# Patient Record
Sex: FEMALE | Race: BLACK OR AFRICAN AMERICAN
[De-identification: names, ages, dates, MRNs, and addresses within clinical notes are randomized per-mention and may not be internally consistent; named-entity substitution may affect disease eponyms.]

---

## 2017-08-21 ENCOUNTER — HOSPITAL ENCOUNTER (EMERGENCY)
Dept: HOSPITAL 62 - ER | Age: 27
LOS: 1 days | Discharge: HOME | End: 2017-08-22
Payer: COMMERCIAL

## 2017-08-21 DIAGNOSIS — R06.00: ICD-10-CM

## 2017-08-21 DIAGNOSIS — R06.02: ICD-10-CM

## 2017-08-21 DIAGNOSIS — R00.0: ICD-10-CM

## 2017-08-21 DIAGNOSIS — J18.1: Primary | ICD-10-CM

## 2017-08-21 DIAGNOSIS — R06.01: ICD-10-CM

## 2017-08-21 PROCEDURE — 96360 HYDRATION IV INFUSION INIT: CPT

## 2017-08-21 PROCEDURE — 93010 ELECTROCARDIOGRAM REPORT: CPT

## 2017-08-21 PROCEDURE — 36415 COLL VENOUS BLD VENIPUNCTURE: CPT

## 2017-08-21 PROCEDURE — 71010: CPT

## 2017-08-21 PROCEDURE — 99285 EMERGENCY DEPT VISIT HI MDM: CPT

## 2017-08-21 PROCEDURE — 83880 ASSAY OF NATRIURETIC PEPTIDE: CPT

## 2017-08-21 PROCEDURE — 85379 FIBRIN DEGRADATION QUANT: CPT

## 2017-08-21 PROCEDURE — 93005 ELECTROCARDIOGRAM TRACING: CPT

## 2017-08-21 PROCEDURE — 85025 COMPLETE CBC W/AUTO DIFF WBC: CPT

## 2017-08-21 PROCEDURE — 80048 BASIC METABOLIC PNL TOTAL CA: CPT

## 2017-08-21 PROCEDURE — 84484 ASSAY OF TROPONIN QUANT: CPT

## 2017-08-22 VITALS — SYSTOLIC BLOOD PRESSURE: 133 MMHG | DIASTOLIC BLOOD PRESSURE: 95 MMHG

## 2017-08-22 LAB
ANION GAP SERPL CALC-SCNC: 8 MMOL/L (ref 5–19)
BASOPHILS # BLD AUTO: 0.1 10^3/UL (ref 0–0.2)
BASOPHILS NFR BLD AUTO: 1.1 % (ref 0–2)
BUN SERPL-MCNC: 11 MG/DL (ref 7–20)
CALCIUM: 9 MG/DL (ref 8.4–10.2)
CHLORIDE SERPL-SCNC: 103 MMOL/L (ref 98–107)
CO2 SERPL-SCNC: 26 MMOL/L (ref 22–30)
CREAT SERPL-MCNC: 0.96 MG/DL (ref 0.52–1.25)
EOSINOPHIL # BLD AUTO: 0.1 10^3/UL (ref 0–0.6)
EOSINOPHIL NFR BLD AUTO: 0.8 % (ref 0–6)
ERYTHROCYTE [DISTWIDTH] IN BLOOD BY AUTOMATED COUNT: 14.6 % (ref 11.5–14)
GLUCOSE SERPL-MCNC: 96 MG/DL (ref 75–110)
HCT VFR BLD CALC: 35.7 % (ref 36–47)
HGB BLD-MCNC: 11.7 G/DL (ref 12–15.5)
HGB HCT DIFFERENCE: -0.6
LYMPHOCYTES # BLD AUTO: 1.1 10^3/UL (ref 0.5–4.7)
LYMPHOCYTES NFR BLD AUTO: 14.5 % (ref 13–45)
MCH RBC QN AUTO: 21.8 PG (ref 27–33.4)
MCHC RBC AUTO-ENTMCNC: 32.8 G/DL (ref 32–36)
MCV RBC AUTO: 67 FL (ref 80–97)
MONOCYTES # BLD AUTO: 0.9 10^3/UL (ref 0.1–1.4)
MONOCYTES NFR BLD AUTO: 11.9 % (ref 3–13)
NEUTROPHILS # BLD AUTO: 5.3 10^3/UL (ref 1.7–8.2)
NEUTS SEG NFR BLD AUTO: 71.7 % (ref 42–78)
POTASSIUM SERPL-SCNC: 3.9 MMOL/L (ref 3.6–5)
RBC # BLD AUTO: 5.37 10^6/UL (ref 3.72–5.28)
SODIUM SERPL-SCNC: 137.1 MMOL/L (ref 137–145)
TROPONIN I SERPL-MCNC: < 0.012 NG/ML
WBC # BLD AUTO: 7.4 10^3/UL (ref 4–10.5)

## 2017-08-22 NOTE — ER DOCUMENT REPORT
ED General





- General


Chief Complaint: Cough


Stated Complaint: TROUBLE BREATHING


Time Seen by Provider: 08/22/17 00:00


Notes: 





Patient is a 27-year-old female without past medical history who presents with 

36 hours of cough, shortness of breath and lightheadedness.  No known sick 

contacts.  Denies any fever.  No history of similar symptoms in the past.  She 

has not seen her primary care doctor regarding today's concerns.  Nothing 

improves or worsens her symptoms.  She denies any history of DVT or pulmonary 

embolus.  She denies any chest pain or pleuritic pain.  Her shortness of breath 

does not prevent her from walking or completing activities of daily living.  

Denies any use of estrogen.


TRAVEL OUTSIDE OF THE [place] IN LAST 30 DAYS: No





- Related Data


Allergies/Adverse Reactions: 


 





No Known Allergies Allergy (Unverified 01/03/14 03:41)


 











Past Medical History





- General


Information source: Patient





- Social History


Smoking Status: Never Smoker


Frequency of alcohol use: None


Drug Abuse: None


Lives with: Family


Family History: Reviewed & Not Pertinent


Patient has suicidal ideation: No


Patient has homicidal ideation: No


Renal/ Medical History: Denies: Hx Peritoneal Dialysis





- Immunizations


Hx Diphtheria, Pertussis, Tetanus Vaccination: Yes





Review of Systems





- Review of Systems


Notes: 





Constitutional: Negative for fever.


HENT: Negative for sore throat.


Eyes: Negative for visual changes.


Cardiovascular: Negative for chest pain.


Respiratory: Positive for shortness of breath.


Gastrointestinal: Negative for abdominal pain, vomiting or diarrhea.


Genitourinary: Negative for dysuria.


Musculoskeletal: Negative for back pain.


Skin: Negative for rash.


Neurological: Negative for headaches, weakness or numbness.





10 point ROS negative except as marked above and in HPI.





Physical Exam





- Vital signs


Vitals: 


 











Temp Pulse Resp BP Pulse Ox


 


 99.3 F   109 H  20   150/100 H  99 


 


 08/21/17 22:39  08/21/17 22:39  08/21/17 22:39  08/21/17 22:39  08/21/17 22:39











Interpretation: Tachycardic


Notes: 





PHYSICAL EXAMINATION:





GENERAL: Well-appearing, well-nourished and in no acute distress.





HEAD: Atraumatic, normocephalic.





EYES: Pupils equal round and reactive to light, extraocular movements intact, 

sclera anicteric, conjunctiva are normal.





ENT: nares patent, oropharynx clear without exudates.  Moderately dry mucous 

membranes.





NECK: Normal range of motion, supple without lymphadenopathy





LUNGS: Mild tachypnea, no distress breath sounds clear to auscultation 

bilaterally and equal.  No wheezes rales or rhonchi.





HEART: Regular tachycardia without murmurs





ABDOMEN: Soft, nontender, normoactive bowel sounds.  No guarding, no rebound.  

No masses appreciated.





EXTREMITIES: Normal range of motion, no pitting or edema.  No cyanosis.





NEUROLOGICAL: No focal neurological deficits. Moves all extremities 

spontaneously and on command.





PSYCH: Normal mood, normal affect.





SKIN: Warm, Dry, normal turgor, no rashes or lesions noted.





Course





- Re-evaluation


Re-evalutation: 





08/22/17 00:59


Patient presents with dyspnea, tachycardia, tachypnea, moderately increased 

work of breathing.  No respiratory distress.  She reports a history of cough 

without production of sputum as well as dyspnea over the last 36 hours.  Vitals 

as noted in triage are incorrect patient is actually breathing at 32 times a 

minute time of my initial assessment, heart rate is 115, she remains afebrile.  

Differential diagnosis at this time includes acute bronchitis, pneumonia, 

possible pulmonary embolus, less likely a myocarditis or pericarditis.  Will 

obtain labs, EKG, d-dimer and reassess.


08/22/17 03:28


D-dimer within normal limits.  Remaining labs unremarkable.  A bedside 

echocardiogram does not demonstrate any evidence of pericardial effusion or 

regional wall motion abnormalities.  Based on patient's continued tachycardia, 

I did also obtain a proBNP and troponin to further evaluate for possible 

myocarditis and these are likewise normal.   Awaiting formal radiology read.  

Patient's heart rate has now normalized current rate is 98.  Tachypnea is also 

improved. 





08/22/17 03:57


Patient's tachycardia has now resolved heart rate is 98.  Chest x-ray is read 

as a small left lower lobe opacity consistent with a possible diagnosis of a 

pneumonia.  She will be started on levofloxacin.  Given her normalization of 

vitals and otherwise reassuring evaluation will discharge.  At this time will 

discharge with return precautions and follow-up recommendations.  Verbal 

discharge instructions given a the bedside and opportunity for questions given. 

Medication warnings reviewed. Patient is in agreement with this plan and has 

verbalized understanding of return precautions and the need for primary care 

follow-up in the next 24-72 hours.





- Vital Signs


Vital signs: 


 











Temp Pulse Resp BP Pulse Ox


 


 99.2 F   109 H  27 H  117/91 H  98 


 


 08/22/17 02:34  08/21/17 22:39  08/22/17 03:31  08/22/17 03:31  08/22/17 03:31














- Laboratory


Result Diagrams: 


 08/22/17 00:59





 08/22/17 00:59


Laboratory results interpreted by me: 


 











  08/22/17





  00:59


 


RBC  5.37 H


 


Hgb  11.7 L


 


Hct  35.7 L


 


MCV  67 L


 


MCH  21.8 L


 


RDW  14.6 H














- Diagnostic Test


Radiology reviewed: Image reviewed, Reports reviewed


Radiology results interpreted by me: 





08/22/17 03:57


Chest x-ray: Small left lower lobe infiltrate





- EKG Interpretation by Me


Additional EKG results interpreted by me: 





08/22/17 02:26


Sinus tachycardia.  Rate 106.  No ST elevations or depressions.  QTC is 431.





Discharge





- Discharge


Clinical Impression: 


Left lower lobe pneumonia


Qualifiers:


 Pneumonia type: due to unspecified organism Qualified Code(s): J18.1 - Lobar 

pneumonia, unspecified organism





Dyspnea


Qualifiers:


 Dyspnea type: shortness of breath Qualified Code(s): R06.02 - Shortness of 

breath; R06.00 - Dyspnea, unspecified; R06.01 - Orthopnea





Condition: Good


Disposition: HOME, SELF-CARE


Additional Instructions: 


P You have been diagnosed with a pneumonia.  It is very important that you take 

all of your antibiotics until they are gone even if you are feeling better.  

Please return to the emergency department immediately if you began having 

worsening shortness of breath, become confused, have worsening pain, pass out, 

have persistent vomiting that prevents you from being able to drink fluids for 

more than 12 hours, or have any other symptoms that are worrisome to you.  

Please follow-up with your primary care doctor in the next 1-2 days. 


Prescriptions: 


Levofloxacin [Levaquin 750 mg Tablet] 750 mg PO DAILY #4 tablet


Referrals: 


MOOKIE ESCALERA NP [Primary Care Provider] - Follow up in 3-5 days

## 2017-08-22 NOTE — RADIOLOGY REPORT (SQ)
EXAM DESCRIPTION:  CHEST SINGLE VIEW



COMPLETED DATE/TIME:  8/22/2017 3:20 am



REASON FOR STUDY:  sob



COMPARISON:  1/3/2014.



EXAM PARAMETERS:  NUMBER OF VIEWS: One view.

TECHNIQUE: Single frontal radiographic view of the chest acquired.

RADIATION DOSE: NA

LIMITATIONS: Moderate-small lung volumes decreases sensitivity-specificity.



FINDINGS:  LUNGS AND PLEURA: Moderate -small lung volumes.  Minimal left basilar haziness-layered eff
usion.

MEDIASTINUM AND HILAR STRUCTURES: No masses.  Contour normal.

HEART AND VASCULAR STRUCTURES: Heart normal in size.  Normal vasculature.

BONES: No acute findings.

HARDWARE: None in the chest.

OTHER: No other significant finding.



IMPRESSION:  Moderate-small lung volumes.  Minimal left basilar opacity-effusion.



TECHNICAL DOCUMENTATION:  JOB ID:  [id number]

## 2017-08-22 NOTE — EKG REPORT
SEVERITY:- ABNORMAL ECG -

SINUS TACHYCARDIA

NONSPECIFIC T ABNORMALITIES, INFERIOR LEADS

:

Confirmed by: Adrianne Newsome 22-Aug-2017 09:28:12

## 2017-12-27 ENCOUNTER — HOSPITAL ENCOUNTER (OUTPATIENT)
Dept: HOSPITAL 62 - II | Age: 27
Discharge: HOME | End: 2017-12-27
Attending: INTERNAL MEDICINE
Payer: COMMERCIAL

## 2017-12-27 VITALS — DIASTOLIC BLOOD PRESSURE: 89 MMHG | SYSTOLIC BLOOD PRESSURE: 146 MMHG

## 2017-12-27 DIAGNOSIS — D50.8: Primary | ICD-10-CM

## 2017-12-27 PROCEDURE — 96366 THER/PROPH/DIAG IV INF ADDON: CPT

## 2017-12-27 PROCEDURE — 96365 THER/PROPH/DIAG IV INF INIT: CPT

## 2017-12-27 PROCEDURE — 96375 TX/PRO/DX INJ NEW DRUG ADDON: CPT

## 2017-12-27 PROCEDURE — 3E033GC INTRODUCTION OF OTHER THERAPEUTIC SUBSTANCE INTO PERIPHERAL VEIN, PERCUTANEOUS APPROACH: ICD-10-PCS | Performed by: INTERNAL MEDICINE

## 2017-12-27 PROCEDURE — 96367 TX/PROPH/DG ADDL SEQ IV INF: CPT

## 2018-01-03 ENCOUNTER — HOSPITAL ENCOUNTER (OUTPATIENT)
Dept: HOSPITAL 62 - II | Age: 28
Discharge: HOME | End: 2018-01-03
Attending: INTERNAL MEDICINE
Payer: COMMERCIAL

## 2018-01-03 VITALS — SYSTOLIC BLOOD PRESSURE: 106 MMHG | DIASTOLIC BLOOD PRESSURE: 52 MMHG

## 2018-01-03 DIAGNOSIS — D50.8: Primary | ICD-10-CM

## 2018-01-03 PROCEDURE — 96366 THER/PROPH/DIAG IV INF ADDON: CPT

## 2018-01-03 PROCEDURE — 96365 THER/PROPH/DIAG IV INF INIT: CPT

## 2018-01-03 PROCEDURE — 3E033GC INTRODUCTION OF OTHER THERAPEUTIC SUBSTANCE INTO PERIPHERAL VEIN, PERCUTANEOUS APPROACH: ICD-10-PCS | Performed by: INTERNAL MEDICINE

## 2018-01-10 ENCOUNTER — HOSPITAL ENCOUNTER (OUTPATIENT)
Dept: HOSPITAL 62 - II | Age: 28
Discharge: HOME | End: 2018-01-10
Attending: INTERNAL MEDICINE
Payer: COMMERCIAL

## 2018-01-10 VITALS — DIASTOLIC BLOOD PRESSURE: 82 MMHG | SYSTOLIC BLOOD PRESSURE: 136 MMHG

## 2018-01-10 DIAGNOSIS — D50.8: Primary | ICD-10-CM

## 2018-01-10 PROCEDURE — 3E033GC INTRODUCTION OF OTHER THERAPEUTIC SUBSTANCE INTO PERIPHERAL VEIN, PERCUTANEOUS APPROACH: ICD-10-PCS | Performed by: INTERNAL MEDICINE

## 2018-01-10 PROCEDURE — 96367 TX/PROPH/DG ADDL SEQ IV INF: CPT

## 2018-01-10 PROCEDURE — 96365 THER/PROPH/DIAG IV INF INIT: CPT

## 2018-01-10 PROCEDURE — 96366 THER/PROPH/DIAG IV INF ADDON: CPT

## 2018-01-17 ENCOUNTER — HOSPITAL ENCOUNTER (OUTPATIENT)
Dept: HOSPITAL 62 - II | Age: 28
Discharge: HOME | End: 2018-01-17
Attending: INTERNAL MEDICINE
Payer: COMMERCIAL

## 2018-01-17 VITALS — DIASTOLIC BLOOD PRESSURE: 78 MMHG | SYSTOLIC BLOOD PRESSURE: 138 MMHG

## 2018-01-17 DIAGNOSIS — D50.8: Primary | ICD-10-CM

## 2018-01-17 PROCEDURE — 96365 THER/PROPH/DIAG IV INF INIT: CPT

## 2018-01-17 PROCEDURE — 96366 THER/PROPH/DIAG IV INF ADDON: CPT

## 2018-01-17 PROCEDURE — 3E033GC INTRODUCTION OF OTHER THERAPEUTIC SUBSTANCE INTO PERIPHERAL VEIN, PERCUTANEOUS APPROACH: ICD-10-PCS | Performed by: INTERNAL MEDICINE

## 2018-01-17 RX ADMIN — SODIUM CHLORIDE PRN ML: 0.9 INJECTION, SOLUTION INTRAVENOUS at 08:46

## 2018-01-17 RX ADMIN — SODIUM CHLORIDE PRN ML: 0.9 INJECTION, SOLUTION INTRAVENOUS at 07:56

## 2019-04-08 ENCOUNTER — HOSPITAL ENCOUNTER (EMERGENCY)
Dept: HOSPITAL 62 - ER | Age: 29
Discharge: HOME | End: 2019-04-08
Payer: COMMERCIAL

## 2019-04-08 VITALS — SYSTOLIC BLOOD PRESSURE: 95 MMHG | DIASTOLIC BLOOD PRESSURE: 54 MMHG

## 2019-04-08 DIAGNOSIS — D57.1: ICD-10-CM

## 2019-04-08 DIAGNOSIS — R35.0: ICD-10-CM

## 2019-04-08 DIAGNOSIS — R53.83: ICD-10-CM

## 2019-04-08 DIAGNOSIS — R11.0: ICD-10-CM

## 2019-04-08 DIAGNOSIS — R39.15: ICD-10-CM

## 2019-04-08 DIAGNOSIS — R50.9: ICD-10-CM

## 2019-04-08 DIAGNOSIS — N30.00: Primary | ICD-10-CM

## 2019-04-08 LAB
APPEARANCE UR: (no result)
APTT PPP: YELLOW S
BILIRUB UR QL STRIP: NEGATIVE
GLUCOSE UR STRIP-MCNC: NEGATIVE MG/DL
KETONES UR STRIP-MCNC: NEGATIVE MG/DL
NITRITE UR QL STRIP: NEGATIVE
PH UR STRIP: 5 [PH] (ref 5–9)
PROT UR STRIP-MCNC: 100 MG/DL
SP GR UR STRIP: 1.02
UROBILINOGEN UR-MCNC: 2 MG/DL (ref ?–2)

## 2019-04-08 PROCEDURE — 81025 URINE PREGNANCY TEST: CPT

## 2019-04-08 PROCEDURE — S0119 ONDANSETRON 4 MG: HCPCS

## 2019-04-08 PROCEDURE — 87086 URINE CULTURE/COLONY COUNT: CPT

## 2019-04-08 PROCEDURE — 81001 URINALYSIS AUTO W/SCOPE: CPT

## 2019-04-08 PROCEDURE — 87186 SC STD MICRODIL/AGAR DIL: CPT

## 2019-04-08 PROCEDURE — 99283 EMERGENCY DEPT VISIT LOW MDM: CPT

## 2019-04-08 PROCEDURE — 87088 URINE BACTERIA CULTURE: CPT

## 2019-04-08 NOTE — ER DOCUMENT REPORT
ED General





- General


Chief Complaint: Nausea


Stated Complaint: FEVER


Time Seen by Provider: 04/08/19 13:44


Primary Care Provider: 


MOOKIE ESCALERA NP [Primary Care Provider] - Follow up as needed


Mode of Arrival: Ambulatory


Information source: Patient


Notes: 





28-year-old female presents to ED for complaint of fever nausea since Wednesday.

 She went to see Dr. BRITTANY bettencourt today and was told to come to the emergency room.  

She is alert oriented respirations regular and unlabored speaking in full 

sentences.  According to the note I got from [place] he she had a fever of 101 at

his office.  Patient denies any pain at this time.  She denies any nausea or 

vomiting.  She states she just does not feel good.  He did do blood work and her

white count was 12.6.


TRAVEL OUTSIDE OF THE [place] IN LAST 30 DAYS: No





- HPI


Onset: Other


Onset/Duration: Gradual - Wednesday


Quality of pain: No pain


Severity: None


Pain Level: Denies


Associated symptoms: Nausea, Other - Fatigue and frequent urination


Exacerbated by: Denies


Relieved by: Denies


Similar symptoms previously: Yes


Recently seen / treated by doctor: Yes





- Related Data


Allergies/Adverse Reactions: 


                                        





No Known Allergies Allergy (Verified 04/08/19 13:21)


   











Past Medical History





- General


Information source: Patient





- Social History


Smoking Status: Never Smoker


Frequency of alcohol use: None


Drug Abuse: None


Lives with: Family


Family History: Reviewed & Not Pertinent


Patient has suicidal ideation: No


Patient has homicidal ideation: No





- Past Medical History


Cardiac Medical History: Reports: None


Pulmonary Medical History: Reports: None


EENT Medical History: Reports: None


Neurological Medical History: Reports: None


Endocrine Medical History: Reports: None


Renal/ Medical History: Reports: None


Malignancy Medical History: Reports: None


GI Medical History: Reports: None


Musculoskeletal Medical History: Reports None


Skin Medical History: Reports None


Psychiatric Medical History: Reports: None


Traumatic Medical History: Reports: None


Infectious Medical History: Reports: None


Surgical Hx: Negative


Past Surgical History: Reports: None





- Immunizations


Immunizations up to date: Yes


Hx Diphtheria, Pertussis, Tetanus Vaccination: Yes





Review of Systems





- Review of Systems


Constitutional: No symptoms reported


EENT: No symptoms reported


Cardiovascular: No symptoms reported


Respiratory: No symptoms reported


Gastrointestinal: No symptoms reported


Genitourinary: Frequency, Urgency


Female Genitourinary: No symptoms reported


Musculoskeletal: No symptoms reported


Skin: No symptoms reported


Hematologic/Lymphatic: No symptoms reported


Neurological/Psychological: No symptoms reported


-: Yes All other systems reviewed and negative





Physical Exam





- Vital signs


Vitals: 


                                        











Temp Pulse Resp BP Pulse Ox


 


 98.5 F   105 H  16   95/54 L  95 


 


 04/08/19 13:25  04/08/19 13:25  04/08/19 13:25  04/08/19 13:25  04/08/19 13:25











Interpretation: Normal





- General


General appearance: Appears well, Alert





- HEENT


Head: Normocephalic, Atraumatic


Eyes: Normal


Pupils: PERRL





- Respiratory


Respiratory status: No respiratory distress


Chest status: Nontender


Breath sounds: Normal


Chest palpation: Normal





- Cardiovascular


Rhythm: Regular


Heart sounds: Normal auscultation


Murmur: No





- Abdominal


Inspection: Normal


Distension: No distension


Bowel sounds: Normal


Tenderness: Nontender


Organomegaly: No organomegaly





- Back


Back: Normal, Nontender





- Extremities


General upper extremity: Normal inspection, Nontender, Normal color, Normal ROM,

Normal temperature


General lower extremity: Normal inspection, Nontender, Normal color, Normal ROM,

Normal temperature, Normal weight bearing.  No: Reno's sign





- Neurological


Neuro grossly intact: Yes


Cognition: Normal


Orientation: AAOx4


Donora Coma Scale Eye Opening: Spontaneous


Markus Coma Scale Verbal: Oriented


Markus Coma Scale Motor: Obeys Commands


Donora Coma Scale Total: 15


Speech: Normal


Motor strength normal: LUE, RUE, LLE, RLE


Sensory: Normal





- Psychological


Associated symptoms: Normal affect, Normal mood





- Skin


Skin Temperature: Warm


Skin Moisture: Dry


Skin Color: Normal





Course





- Re-evaluation


Re-evalutation: 





04/08/19 22:13


To  with results of urine and examination.  He agreed with plan to 

treat patient with Macrobid and discharged home to follow-up with primary care 

doctor.  He states he was just concerned the way the patient was acting in his 

office.  He treats the patient for sickle cell and anemia.  He was discharged 

home and instructed to follow-up with primary care doctor.





- Vital Signs


Vital signs: 


                                        











Temp Pulse Resp BP Pulse Ox


 


 98.3 F   105 H  16   95/54 L  95 


 


 04/08/19 16:00  04/08/19 13:25  04/08/19 13:25  04/08/19 13:25  04/08/19 13:25














- Laboratory


Laboratory results interpreted by me: 


                                        











  04/08/19





  14:30


 


Urine Protein  100 H


 


Urine Urobilinogen  2.0 H


 


Ur Leukocyte Esterase  LARGE H














Discharge





- Discharge


Clinical Impression: 


 Nausea





UTI (urinary tract infection)


Qualifiers:


 Urinary tract infection type: acute cystitis Hematuria presence: without 

hematuria Qualified Code(s): N30.00 - Acute cystitis without hematuria





Condition: Stable


Disposition: HOME, SELF-CARE


Additional Instructions: 


URINARY TRACT INFECTION:





     Your evaluation indicates that you have a urinary tract infection. This is 

due to germs growing in the bladder.  This is a common problem.


     This infection usually responds quickly to antibiotics.  Your antibiotic 

should be taken exactly as prescribed.  Drink plenty of fluids -- three to four 

quarts a day.


     Occasionally, a bladder anesthetic will be prescribed to help stop the 

feeling of urgency until the antibiotic has a chance to clear the infection.  

This may cause your urine to be dark orange.


     Certain urine infections require a culture.  If the doctor obtained a 

culture, the results will be back in two days.  You should call to see if a 

change in treatment is needed.


     A repeat urinalysis after you finish treatment is often recommended.  The 

physician will let you know if further testing is required.


     Call the doctor if you develop fever, chills, flank pain, inability to 

urinate, or blood in the urine.











NITROFURANTOIN (MACRODANTIN, MACROBID):


     You have received a prescription for nitrofurantoin (Macrodantin). This 

antibiotic is used for urinary tract infections.





Women who are pregnant or nursing should notify the physician before taking this

medicine.  If you have ever had a problem caused by this medication in the past,

be sure the physician is aware of it.


     Common side effects of this medicine include nausea, vomiting, or decreased

appetite.  Notify your physician if these side effects become severe.


     Immediately stop this medicine and call the physician if you develop cough,

shortness of breath, chest pain, weakness, jaundice (yellow color of the skin 

and whites of the eyes), or a skin rash.





Antinausea Medication





     You have been given a medication to suppress nausea and vomiting. This type

of medication can be given as a shot, pill, or suppository. It will usually last

for many hours.  Pills and shots usually last six to eight hours, suppositories 

last about 12 hours.  For the typical illness, only one or two doses of the 

medication may be necessary.


     Mild lightheadedness may occur.  This type of medicine can cause 

drowsiness.  Do not drive or operate dangerous machinery while under its 

influence.  Do not mix with alcohol.


     See your doctor at once if you have muscle spasms or tightness, or 

uncontrollable motions (particularly of the neck, mouth, or jaw). Persistent 

vomiting or severe lightheadedness should also be evaluated by the physician.





Acetaminophen





     Acetaminophen may be taken for pain relief or fever control. It's much 

safer than aspirin, offering a wider range of "safe" dosages.  It is safe during

pregnancy.  Some brand names are Tylenol, Panadol, Datril, Anacin 3, Tempra, and

Liquiprin. Acetaminophen can be repeated every four hours.  The following are 

maximum recommended dosages:





WEIGHT         Dose             Drops                  Elixir        

Chewable(80mg)


(LBS.)                            drprs=droppers    tsp=teaspoon


6                 40 mg            .4 ml (1/2)


6-11            80 mg            .8 ml (full)            1/2   tsp           1  

    tab


12-16         120 mg           1 1/2 drprs            3/4   tsp           1 1/2 

tabs


17-23         160 mg             2  drprs              1      tsp           2   

   tabs


24-30         240 mg             3  drprs              1 1/2 tsp           3    

  tabs


30-35         320 mg                                     2       tsp           4

      tabs


36-41         360 mg                                     2 1/4 tsp           4 

1/2  tabs


42-47         400 mg                                     2 1/2 tsp           5  

    tabs


48-53         480 mg                                     3       tsp          6 

     tabs


54-59         520 mg                                     3  1/4 tsp          6 

1/2 tabs


60-64         560 mg                                     3  1/2 tsp          7  

   tabs 


65-70         600 mg                                     3  3/4 tsp          7 

1/2 tabs


71-76         640 mg                                     4       tsp           8

     tabs


77-82         720 mg                                     4 1/2  tsp           9 

    tabs


83-88         800 mg                                     5       tsp           

10     tabs





>89 pounds or adults          650 mg to 900 mg 





Acetaminophen can be repeated every four hours. Maximum daily dose not to exceed

4000 mg.





   These maximum recommended dosages are slightly higher than the dosages 

written on the product container, but these dosages are very safe and well below

the toxic dosage for acetaminophen.








FOLLOW-UP CARE:


If you have been referred to a physician for follow-up care, call the 

physicians office for an appointment as you were instructed or within the next 

two days.  If you experience worsening or a significant change in your symptoms,

notify the physician immediately or return to the Emergency Department at any 

time for re-evaluation.











Prescriptions: 


Nitrofurantoin/Nitrofuran Mac [Macrobid 100 mg Capsule] 1 tab PO BID #14 capsule


Forms:  Return to Work


Referrals: 


MOOKIE ESCALERA, NP [Primary Care Provider] - Follow up as needed